# Patient Record
Sex: FEMALE | Race: WHITE | NOT HISPANIC OR LATINO | URBAN - METROPOLITAN AREA
[De-identification: names, ages, dates, MRNs, and addresses within clinical notes are randomized per-mention and may not be internally consistent; named-entity substitution may affect disease eponyms.]

---

## 2017-10-17 ENCOUNTER — INPATIENT (INPATIENT)
Facility: HOSPITAL | Age: 82
LOS: 0 days | Discharge: HOME HEALTH | End: 2017-10-18
Attending: INTERNAL MEDICINE | Admitting: INTERNAL MEDICINE
Payer: COMMERCIAL

## 2017-10-17 VITALS
HEIGHT: 63 IN | DIASTOLIC BLOOD PRESSURE: 96 MMHG | HEART RATE: 92 BPM | TEMPERATURE: 98 F | RESPIRATION RATE: 21 BRPM | OXYGEN SATURATION: 96 % | SYSTOLIC BLOOD PRESSURE: 179 MMHG | WEIGHT: 93.04 LBS

## 2017-10-17 DIAGNOSIS — E03.9 HYPOTHYROIDISM, UNSPECIFIED: ICD-10-CM

## 2017-10-17 DIAGNOSIS — R42 DIZZINESS AND GIDDINESS: ICD-10-CM

## 2017-10-17 DIAGNOSIS — R06.02 SHORTNESS OF BREATH: ICD-10-CM

## 2017-10-17 DIAGNOSIS — J84.10 PULMONARY FIBROSIS, UNSPECIFIED: ICD-10-CM

## 2017-10-17 LAB
ALBUMIN SERPL ELPH-MCNC: 3.6 G/DL — SIGNIFICANT CHANGE UP (ref 3.3–5)
ALP SERPL-CCNC: 129 U/L — HIGH (ref 40–120)
ALT FLD-CCNC: 28 U/L — SIGNIFICANT CHANGE UP (ref 12–78)
ANION GAP SERPL CALC-SCNC: 9 MMOL/L — SIGNIFICANT CHANGE UP (ref 5–17)
APTT BLD: 31.2 SEC — SIGNIFICANT CHANGE UP (ref 27.5–37.4)
AST SERPL-CCNC: 39 U/L — HIGH (ref 15–37)
BASOPHILS # BLD AUTO: 0 K/UL — SIGNIFICANT CHANGE UP (ref 0–0.2)
BASOPHILS NFR BLD AUTO: 0.4 % — SIGNIFICANT CHANGE UP (ref 0–2)
BILIRUB SERPL-MCNC: 0.7 MG/DL — SIGNIFICANT CHANGE UP (ref 0.2–1.2)
BUN SERPL-MCNC: 16 MG/DL — SIGNIFICANT CHANGE UP (ref 7–23)
CALCIUM SERPL-MCNC: 9 MG/DL — SIGNIFICANT CHANGE UP (ref 8.5–10.1)
CHLORIDE SERPL-SCNC: 99 MMOL/L — SIGNIFICANT CHANGE UP (ref 96–108)
CK MB BLD-MCNC: 2.8 % — SIGNIFICANT CHANGE UP (ref 0–3.5)
CK MB CFR SERPL CALC: 2.6 NG/ML — SIGNIFICANT CHANGE UP (ref 0.5–3.6)
CK SERPL-CCNC: 94 U/L — SIGNIFICANT CHANGE UP (ref 26–192)
CO2 SERPL-SCNC: 28 MMOL/L — SIGNIFICANT CHANGE UP (ref 22–31)
CREAT SERPL-MCNC: 0.62 MG/DL — SIGNIFICANT CHANGE UP (ref 0.5–1.3)
EOSINOPHIL # BLD AUTO: 0 K/UL — SIGNIFICANT CHANGE UP (ref 0–0.5)
EOSINOPHIL NFR BLD AUTO: 0.1 % — SIGNIFICANT CHANGE UP (ref 0–6)
FLUAV SPEC QL CULT: NEGATIVE — SIGNIFICANT CHANGE UP
FLUBV AG SPEC QL IA: NEGATIVE — SIGNIFICANT CHANGE UP
GLUCOSE SERPL-MCNC: 138 MG/DL — HIGH (ref 70–99)
HCT VFR BLD CALC: 42.9 % — SIGNIFICANT CHANGE UP (ref 34.5–45)
HGB BLD-MCNC: 14.4 G/DL — SIGNIFICANT CHANGE UP (ref 11.5–15.5)
INR BLD: 1.15 RATIO — SIGNIFICANT CHANGE UP (ref 0.88–1.16)
LACTATE SERPL-SCNC: 2 MMOL/L — SIGNIFICANT CHANGE UP (ref 0.7–2)
LYMPHOCYTES # BLD AUTO: 1 K/UL — SIGNIFICANT CHANGE UP (ref 1–3.3)
LYMPHOCYTES # BLD AUTO: 13.2 % — SIGNIFICANT CHANGE UP (ref 13–44)
MCHC RBC-ENTMCNC: 33.5 GM/DL — SIGNIFICANT CHANGE UP (ref 32–36)
MCHC RBC-ENTMCNC: 34.7 PG — HIGH (ref 27–34)
MCV RBC AUTO: 103.3 FL — HIGH (ref 80–100)
MONOCYTES # BLD AUTO: 0.3 K/UL — SIGNIFICANT CHANGE UP (ref 0–0.9)
MONOCYTES NFR BLD AUTO: 4.2 % — SIGNIFICANT CHANGE UP (ref 2–14)
NEUTROPHILS # BLD AUTO: 6.3 K/UL — SIGNIFICANT CHANGE UP (ref 1.8–7.4)
NEUTROPHILS NFR BLD AUTO: 82.1 % — HIGH (ref 43–77)
PLATELET # BLD AUTO: 128 K/UL — LOW (ref 150–400)
POTASSIUM SERPL-MCNC: 4.5 MMOL/L — SIGNIFICANT CHANGE UP (ref 3.5–5.3)
POTASSIUM SERPL-SCNC: 4.5 MMOL/L — SIGNIFICANT CHANGE UP (ref 3.5–5.3)
PROT SERPL-MCNC: 9.4 GM/DL — HIGH (ref 6–8.3)
PROTHROM AB SERPL-ACNC: 12.6 SEC — SIGNIFICANT CHANGE UP (ref 9.8–12.7)
RBC # BLD: 4.15 M/UL — SIGNIFICANT CHANGE UP (ref 3.8–5.2)
RBC # FLD: 11.8 % — SIGNIFICANT CHANGE UP (ref 11–15)
SODIUM SERPL-SCNC: 136 MMOL/L — SIGNIFICANT CHANGE UP (ref 135–145)
TROPONIN I SERPL-MCNC: 0.02 NG/ML — SIGNIFICANT CHANGE UP (ref 0.01–0.04)
WBC # BLD: 7.7 K/UL — SIGNIFICANT CHANGE UP (ref 3.8–10.5)
WBC # FLD AUTO: 7.7 K/UL — SIGNIFICANT CHANGE UP (ref 3.8–10.5)

## 2017-10-17 PROCEDURE — 71010: CPT | Mod: 26

## 2017-10-17 PROCEDURE — 99285 EMERGENCY DEPT VISIT HI MDM: CPT | Mod: 25

## 2017-10-17 PROCEDURE — 99223 1ST HOSP IP/OBS HIGH 75: CPT

## 2017-10-17 RX ORDER — FUROSEMIDE 40 MG
1 TABLET ORAL
Qty: 0 | Refills: 0 | COMMUNITY

## 2017-10-17 RX ORDER — BUDESONIDE, MICRONIZED 100 %
0.25 POWDER (GRAM) MISCELLANEOUS EVERY 12 HOURS
Qty: 0 | Refills: 0 | Status: DISCONTINUED | OUTPATIENT
Start: 2017-10-17 | End: 2017-10-17

## 2017-10-17 RX ORDER — IPRATROPIUM/ALBUTEROL SULFATE 18-103MCG
3 AEROSOL WITH ADAPTER (GRAM) INHALATION EVERY 6 HOURS
Qty: 0 | Refills: 0 | Status: DISCONTINUED | OUTPATIENT
Start: 2017-10-17 | End: 2017-10-18

## 2017-10-17 RX ORDER — IPRATROPIUM/ALBUTEROL SULFATE 18-103MCG
3 AEROSOL WITH ADAPTER (GRAM) INHALATION ONCE
Qty: 0 | Refills: 0 | Status: COMPLETED | OUTPATIENT
Start: 2017-10-17 | End: 2017-10-17

## 2017-10-17 RX ORDER — LEVOTHYROXINE SODIUM 125 MCG
1 TABLET ORAL
Qty: 0 | Refills: 0 | COMMUNITY

## 2017-10-17 RX ORDER — LEVOTHYROXINE SODIUM 125 MCG
25 TABLET ORAL DAILY
Qty: 0 | Refills: 0 | Status: DISCONTINUED | OUTPATIENT
Start: 2017-10-17 | End: 2017-10-18

## 2017-10-17 RX ORDER — FUROSEMIDE 40 MG
20 TABLET ORAL DAILY
Qty: 0 | Refills: 0 | Status: DISCONTINUED | OUTPATIENT
Start: 2017-10-17 | End: 2017-10-18

## 2017-10-17 RX ORDER — ENOXAPARIN SODIUM 100 MG/ML
30 INJECTION SUBCUTANEOUS EVERY 24 HOURS
Qty: 0 | Refills: 0 | Status: DISCONTINUED | OUTPATIENT
Start: 2017-10-17 | End: 2017-10-18

## 2017-10-17 RX ORDER — AZITHROMYCIN 500 MG/1
500 TABLET, FILM COATED ORAL ONCE
Qty: 0 | Refills: 0 | Status: COMPLETED | OUTPATIENT
Start: 2017-10-17 | End: 2017-10-17

## 2017-10-17 RX ORDER — CEFTRIAXONE 500 MG/1
1 INJECTION, POWDER, FOR SOLUTION INTRAMUSCULAR; INTRAVENOUS ONCE
Qty: 0 | Refills: 0 | Status: COMPLETED | OUTPATIENT
Start: 2017-10-17 | End: 2017-10-17

## 2017-10-17 RX ORDER — BUDESONIDE, MICRONIZED 100 %
0.25 POWDER (GRAM) MISCELLANEOUS EVERY 12 HOURS
Qty: 0 | Refills: 0 | Status: DISCONTINUED | OUTPATIENT
Start: 2017-10-17 | End: 2017-10-18

## 2017-10-17 RX ADMIN — AZITHROMYCIN 255 MILLIGRAM(S): 500 TABLET, FILM COATED ORAL at 09:53

## 2017-10-17 RX ADMIN — Medication 3 MILLILITER(S): at 05:23

## 2017-10-17 RX ADMIN — Medication 3 MILLILITER(S): at 12:23

## 2017-10-17 RX ADMIN — CEFTRIAXONE 100 GRAM(S): 500 INJECTION, POWDER, FOR SOLUTION INTRAMUSCULAR; INTRAVENOUS at 09:53

## 2017-10-17 RX ADMIN — Medication 3 MILLILITER(S): at 17:40

## 2017-10-17 RX ADMIN — ENOXAPARIN SODIUM 30 MILLIGRAM(S): 100 INJECTION SUBCUTANEOUS at 11:42

## 2017-10-17 RX ADMIN — Medication 40 MILLIGRAM(S): at 11:42

## 2017-10-17 RX ADMIN — Medication 3 MILLILITER(S): at 23:35

## 2017-10-17 RX ADMIN — Medication 25 MICROGRAM(S): at 12:22

## 2017-10-17 RX ADMIN — Medication 3 MILLILITER(S): at 05:22

## 2017-10-17 RX ADMIN — Medication 20 MILLIGRAM(S): at 12:22

## 2017-10-17 NOTE — PHYSICAL THERAPY INITIAL EVALUATION ADULT - ADDITIONAL COMMENTS
Pt reports of no stairs. Pt reports of 2 steps c L side railing up/R railing down. Pts daughter (Sherrell) reports pt does not negotiate steps secondary to being in house most of the time.

## 2017-10-17 NOTE — PHYSICAL THERAPY INITIAL EVALUATION ADULT - GENERAL OBSERVATIONS, REHAB EVAL
Pt seen supine in bed, alert and Ox4, daughter (Sherrell) at bedside, cardiac monitor intact, nasal cannula intact on 3.5L. Pt /54, Hr 93, O2 96% on supplemental O2.

## 2017-10-17 NOTE — PHYSICAL THERAPY INITIAL EVALUATION ADULT - MODIFIED CLINICAL TEST OF SENSORY INTEGRATION IN BALANCE TEST
Barthel Index: Feeding Score 10__, Bathing Score _5__, Grooming Score 5___, Dressing Score __10_, Bowels Score ___10, Bladder Score 10___, Toilet Score _10__, Transfers Score _10__, Mobility Score 0___, Stairs Score _0__,     Total Score __70_

## 2017-10-17 NOTE — CONSULT NOTE ADULT - ASSESSMENT
patient is a 89 yo female with possible chronic bronchitis on home oxygen came with c/o shortness of breath and increase in cough . Sputum positive for parainfluenza  Acute bronchitis  Exacerbation of COPD     recommendations  continue bronchodilators   add budesonide   continue prednisone   oxygen via Nasal Cannula   on levaquin   plan discussed with patient   Thank you for this consult  will f/u the patient with you

## 2017-10-17 NOTE — ED ADULT NURSE NOTE - PMH
Pleurisy    Vertigo Hypothyroid    Pleurisy    Vertigo Hypothyroid    Pleurisy    Vertigo    West nile virus infection, unspecified

## 2017-10-17 NOTE — PHYSICAL THERAPY INITIAL EVALUATION ADULT - GAIT DEVIATIONS NOTED, PT EVAL
decreased josephine/decreased velocity of limb motion/decreased step length/increased time in double stance/decreased stride length

## 2017-10-17 NOTE — H&P ADULT - NSHPLABSRESULTS_GEN_ALL_CORE
14.4   7.7   )-----------( 128      ( 17 Oct 2017 05:11 )             42.9   10-17    136  |  99  |  16  ----------------------------<  138<H>  4.5   |  28  |  0.62    Ca    9.0      17 Oct 2017 05:11    TPro  9.4<H>  /  Alb  3.6  /  TBili  0.7  /  DBili  x   /  AST  39<H>  /  ALT  28  /  AlkPhos  129<H>  10-17  < from: Xray Chest 1 View AP/PA. (10.17.17 @ 06:04) >    Chronic emphysematous and fibrotic changes both lungs. Cardiomegaly..

## 2017-10-17 NOTE — ED PROVIDER NOTE - OBJECTIVE STATEMENT
Pertinent PMH/PSH/FHx/SHx and Review of Systems contained within:    89yo F w PMH of west nile virus, hypothyroidism, vertigo, pleurisy presents to ED c/o SOB. Pertinent PMH/PSH/FHx/SHx and Review of Systems contained within:    89yo F w PMH of west nile virus, hypothyroidism, vertigo, ?pulm fibrosis, hemoptysis no longer on ASA, ?cardiac arrhythmia presents to ED c/o SOB.  Family states pt has been having incr cough w secretions & today had incr SOB.  No fever, chills, CP, abd pain, vomiting, diarrhea.      No fever/chills, No photophobia/eye pain/changes in vision, No ear pain/sore throat/dysphagia, No chest pain/palpitations, +SOB/cough, No abdominal pain, No N/V/D, no dysuria/frequency/discharge, No neck/back pain, no rash, no changes in neurological status/function.

## 2017-10-17 NOTE — H&P ADULT - NSHPPHYSICALEXAM_GEN_ALL_CORE
GENERAL: NAD well-developed  HEAD:  Atraumatic, Normocephalic  EYES: EOMI, PERRLA, conjunctiva and sclera clear  ENMT: No tonsillar erythema, exudates, or enlargement; Moist mucous membranes, Good dentition, No lesions  NECK: Supple, No JVD, Normal thyroid  NERVOUS SYSTEM:  Alert & Oriented X3, Good concentration; Motor Strength 5/5 B/L upper and lower extremities; DTRs 2+ intact and symmetric  CHEST/LUNG: decreased breath sounds - with rhonchi   HEART: Regular rate and rhythm; No murmurs, rubs, or gallops  ABDOMEN: Soft, Nontender, Nondistended; Bowel sounds present  EXTREMITIES:  2+ Peripheral Pulses, No clubbing, cyanosis, or edema  LYMPH: No lymphadenopathy   SKIN: No rashes or lesions

## 2017-10-17 NOTE — ED PROVIDER NOTE - MEDICAL DECISION MAKING DETAILS
Pt w SOB, improved w [facility] tx.  Labs neg for acute pathology. Pt w SOB, improved w neb tx.  Labs neg for acute pathology.  Given abx for community acquired PNA.  Pt admitted for further eval/mgmt.

## 2017-10-17 NOTE — H&P ADULT - HISTORY OF PRESENT ILLNESS
· HPI Objective Statement: Pertinent PMH/PSH/FHx/SHx and Review of Systems contained within:    	89yo F w PMH of west nile virus, hypothyroidism, vertigo, ?pulm fibrosis, hemoptysis no longer on ASA, ?cardiac arrhythmia presents to ED c/o SOB.  Family states pt has been having incr cough w secretions & today had incr SOB.  No fever, chills, CP, abd pain, vomiting, diarrhea.      	No fever/chills, No photophobia/eye pain/changes in vision, No ear pain/sore throat/dysphagia, No chest pain/palpitations, +SOB/cough, No abdominal pain, No N/V/D, no dysuria/frequency/discharge, No neck/back pain, no rash, no changes in neurological status/function.

## 2017-10-17 NOTE — ED ADULT TRIAGE NOTE - CHIEF COMPLAINT QUOTE
Pt is here for increasing shortness of breath. Pt has a productive cough but states no respiratory disease. Pt has home oxygen but states she does not have any respiratory illness.

## 2017-10-17 NOTE — CONSULT NOTE ADULT - SUBJECTIVE AND OBJECTIVE BOX
Patient is a 89y old  Female who presents with a chief complaint of short of breath (17 Oct 2017 10:45)  HPI:  · HPI Objective Statement: Pertinent PMH/PSH/FHx/SHx and Review of Systems contained within:    	89yo F w PMH of west nile virus, hypothyroidism, vertigo, ?pulm fibrosis, hemoptysis no longer on ASA, ?cardiac arrhythmia presents to ED c/o SOB.  Family states pt has been having incr cough w secretions & today had incr SOB.  No fever, chills, CP, abd pain, vomiting, diarrhea.  As per patient she has cough but not able to bring up anything. patient was given advair but does not take it because she does not like the way it feels    	No fever/chills, No photophobia/eye pain/changes in vision, No ear pain/sore throat/dysphagia, No chest pain/palpitations, +SOB/cough, No abdominal pain, No N/V/D, no dysuria/frequency/discharge, No neck/back pain, no rash, no changes in neurological status/function. (17 Oct 2017 10:45)  PAST MEDICAL & SURGICAL HISTORY:  Pulmonary fibrosis  West nile virus infection, unspecified  Hypothyroid  Vertigo  Pleurisy  No significant past surgical history  FAMILY HISTORY:  No pertinent family history in first degree relatives  Allergies    aspirin (Other)    Intolerances    MEDICATIONS  (STANDING):  ALBUTerol/ipratropium for Nebulization 3 milliLiter(s) Nebulizer every 6 hours  buDESOnide   0.25 milliGRAM(s) Respule 0.25 milliGRAM(s) Inhalation every 12 hours  enoxaparin Injectable 30 milliGRAM(s) SubCutaneous every 24 hours  furosemide    Tablet 20 milliGRAM(s) Oral daily  levoFLOXacin IVPB      levothyroxine 25 MICROGram(s) Oral daily  predniSONE   Tablet 40 milliGRAM(s) Oral daily    MEDICATIONS  (PRN):    ROS  constitutional - denies any fever, weight loss or weight gain , normal appetite   pulmonary- c/o cough , productive , shortness of breath   cardiovascular- denies any chest pain  GI- denies any nausea , vomiting or diarrhea  Neuro- denies any dizziness or weakness  Vital Signs Last 24 Hrs  T(C): 36.9 (17 Oct 2017 16:35), Max: 36.9 (17 Oct 2017 03:54)  T(F): 98.4 (17 Oct 2017 16:35), Max: 98.4 (17 Oct 2017 03:54)  HR: 81 (17 Oct 2017 17:59) (72 - 99)  BP: 114/57 (17 Oct 2017 16:35) (106/67 - 179/96)  BP(mean): --  RR: 16 (17 Oct 2017 16:35) (16 - 23)  SpO2: 97% (17 Oct 2017 17:59) (94% - 100%)  Physical Exam  patient lying in bed in no respiratory distress  HEENT - EOMI, PERRLA ,neck supple , No JVD  lungs - decreased BS, no wheezing , has ronchi bilateral   COR- [id number] , no murmer  Abd- soft, BS+, no tenderness, no guarding   ext- ecc neg, good pulses  Neuro - Alert , oriented X3   Skin- No rashes     PT/INR - ( 17 Oct 2017 05:11 )   PT: 12.6 sec;   INR: 1.15 ratio         PTT - ( 17 Oct 2017 05:11 )  PTT:31.2 secCARDIAC MARKERS ( 17 Oct 2017 05:11 )  .019 ng/mL / x     / 94 U/L / x     / 2.6 ng/mL                          14.4   7.7   )-----------( 128      ( 17 Oct 2017 05:11 )             42.9   10-17    136  |  99  |  16  ----------------------------<  138<H>  4.5   |  28  |  0.62    Ca    9.0      17 Oct 2017 05:11    TPro  9.4<H>  /  Alb  3.6  /  TBili  0.7  /  DBili  x   /  AST  39<H>  /  ALT  28  /  AlkPhos  129<H>  10-17          RVP:  10-17 @ 08:42  229E Coronavirus: --           Adenovirus: --           Bordetella Pertussis --           Chlamydia Pneumoniae --           Entero/Rhinovirus --           HKU1 Coronavirus --           hMPV --           Influenza A --           Influenza AH1 --           Influenza AH1 2009 --           Influenza AH3 --           Influenza B --           Mycoplasma pneumoniae --           NL63 Coronavirus --           OC43 Coronavirus --           Parainfluenza 1 --           Parainfluenza 2 --           Parainfluenza 3 Detected     Parainfluenza 4 --           Resp Syncytial Virus --       CXR - no infiltrates, emphysematous changes

## 2017-10-17 NOTE — H&P ADULT - NSHPREVIEWOFSYSTEMS_GEN_ALL_CORE
CONSTITUTIONAL: No fever, weight loss, or fatigue  EYES: No eye pain, visual disturbances, or discharge  ENMT:  No difficulty hearing, tinnitus, vertigo; No sinus or throat pain  NECK: No pain or stiffness  RESPIRATORY: POSITIVE  cough, occ wheezing,pos  shortness of breath no sputum production   CARDIOVASCULAR: No chest pain, palpitations, dizziness, POSITIVE  leg swelling  GASTROINTESTINAL: No abdominal or epigastric pain. No nausea, vomiting, or hematemesis; No diarrhea or constipation. No melena or hematochezia.  GENITOURINARY: No dysuria, frequency, hematuria, or incontinence  NEUROLOGICAL: No headaches, memory loss, loss of strength, numbness, or tremors  SKIN: No itching, burning, rashes, or lesions   LYMPH NODES: No enlarged glands  ENDOCRINE: No heat or cold intolerance; No hair loss  MUSCULOSKELETAL: No joint pain or swelling; No muscle, back, or extremity pain  PSYCHIATRIC: No depression, anxiety, mood swings, or difficulty sleeping  HEME/LYMPH: No easy bruising, or bleeding gums  ALLERGY AND IMMUNOLOGIC: No hives or eczema

## 2017-10-17 NOTE — ED PROVIDER NOTE - CARE PLAN
Principal Discharge DX:	SOB (shortness of breath) Principal Discharge DX:	SOB (shortness of breath)  Secondary Diagnosis:	Sputum production

## 2017-10-17 NOTE — ED PROVIDER NOTE - PHYSICAL EXAMINATION
Gen: Alert, speaking in complete sentences  Head: NC, AT, EOMI, normal lids/conjunctiva  ENT: normal hearing, patent oropharynx, MMM  Neck: supple, no tenderness/meningismus/JVD, Trachea midline  Pulm: Bilateral wheeze & rales  CV: RRR, no M/R/G, +dist pulses  Abd: soft, NT/ND, +BS, no guarding/rebound tenderness  Mskel: no edema/erythema/cyanosis  Skin: no rash  Neuro: no sensory/motor deficits

## 2017-10-18 VITALS
DIASTOLIC BLOOD PRESSURE: 67 MMHG | SYSTOLIC BLOOD PRESSURE: 120 MMHG | OXYGEN SATURATION: 95 % | HEART RATE: 89 BPM | TEMPERATURE: 99 F | RESPIRATION RATE: 16 BRPM

## 2017-10-18 LAB
ANION GAP SERPL CALC-SCNC: 9 MMOL/L — SIGNIFICANT CHANGE UP (ref 5–17)
BUN SERPL-MCNC: 16 MG/DL — SIGNIFICANT CHANGE UP (ref 7–23)
CALCIUM SERPL-MCNC: 8.8 MG/DL — SIGNIFICANT CHANGE UP (ref 8.5–10.1)
CHLORIDE SERPL-SCNC: 98 MMOL/L — SIGNIFICANT CHANGE UP (ref 96–108)
CO2 SERPL-SCNC: 31 MMOL/L — SIGNIFICANT CHANGE UP (ref 22–31)
CREAT SERPL-MCNC: 0.6 MG/DL — SIGNIFICANT CHANGE UP (ref 0.5–1.3)
GLUCOSE SERPL-MCNC: 120 MG/DL — HIGH (ref 70–99)
HCT VFR BLD CALC: 41.3 % — SIGNIFICANT CHANGE UP (ref 34.5–45)
HGB BLD-MCNC: 13.7 G/DL — SIGNIFICANT CHANGE UP (ref 11.5–15.5)
MCHC RBC-ENTMCNC: 33.1 GM/DL — SIGNIFICANT CHANGE UP (ref 32–36)
MCHC RBC-ENTMCNC: 34.7 PG — HIGH (ref 27–34)
MCV RBC AUTO: 104.8 FL — HIGH (ref 80–100)
PLATELET # BLD AUTO: 104 K/UL — LOW (ref 150–400)
POTASSIUM SERPL-MCNC: 3.9 MMOL/L — SIGNIFICANT CHANGE UP (ref 3.5–5.3)
POTASSIUM SERPL-SCNC: 3.9 MMOL/L — SIGNIFICANT CHANGE UP (ref 3.5–5.3)
RBC # BLD: 3.94 M/UL — SIGNIFICANT CHANGE UP (ref 3.8–5.2)
RBC # FLD: 11.6 % — SIGNIFICANT CHANGE UP (ref 11–15)
SODIUM SERPL-SCNC: 138 MMOL/L — SIGNIFICANT CHANGE UP (ref 135–145)
WBC # BLD: 6.7 K/UL — SIGNIFICANT CHANGE UP (ref 3.8–10.5)
WBC # FLD AUTO: 6.7 K/UL — SIGNIFICANT CHANGE UP (ref 3.8–10.5)

## 2017-10-18 PROCEDURE — 99239 HOSP IP/OBS DSCHRG MGMT >30: CPT

## 2017-10-18 RX ORDER — BUDESONIDE AND FORMOTEROL FUMARATE DIHYDRATE 160; 4.5 UG/1; UG/1
2 AEROSOL RESPIRATORY (INHALATION)
Qty: 1 | Refills: 0 | OUTPATIENT
Start: 2017-10-18 | End: 2017-11-17

## 2017-10-18 RX ADMIN — Medication 25 MICROGRAM(S): at 05:17

## 2017-10-18 RX ADMIN — Medication 40 MILLIGRAM(S): at 05:17

## 2017-10-18 RX ADMIN — Medication 0.25 MILLIGRAM(S): at 05:52

## 2017-10-18 RX ADMIN — Medication 3 MILLILITER(S): at 11:26

## 2017-10-18 RX ADMIN — Medication 20 MILLIGRAM(S): at 05:17

## 2017-10-18 RX ADMIN — Medication 3 MILLILITER(S): at 05:52

## 2017-10-18 NOTE — DISCHARGE NOTE ADULT - HOSPITAL COURSE
89yo F w PMH of west nile virus, hypothyroidism, vertigo, ?pulm fibrosis, hemoptysis no longer on ASA, ?cardiac arrhythmia presents to ED c/o SOB.  Family states pt has been having incr cough w secretions & today had incr SOB.  No fever, chills, CP, abd pain, vomiting, diarrhea.         Problem/Plan - 1:  ·  Problem: SOB (shortness of breath).  Plan: symbicort, prednisone, c/w abx, can f/u as outpatient      Problem/Plan - 2:  ·  Problem: Pulmonary fibrosis.  Plan: as above   home oxygen -has.      Problem/Plan - 3:  ·  Problem: Acquired hypothyroidism.  Plan: synthroid.      Problem/Plan - 4:  ·  Problem: Vertigo.  Plan: physical therapy.       45min spent on discharge planning

## 2017-10-18 NOTE — DISCHARGE NOTE ADULT - MEDICATION SUMMARY - MEDICATIONS TO TAKE
I will START or STAY ON the medications listed below when I get home from the hospital:    predniSONE 20 mg oral tablet  -- 2 tab(s) by mouth once a day  -- Indication: For SHORTNESS OF BREATH    Symbicort 160 mcg-4.5 mcg/inh inhalation aerosol  -- 2 puff(s) inhaled 2 times a day   -- Check with your doctor before becoming pregnant.  For inhalation only.  Rinse mouth thoroughly after use.    -- Indication: For SOB (shortness of breath)    Lasix 20 mg oral tablet  -- 1 tab(s) by mouth once a day  -- Indication: For SOB (shortness of breath)    Levaquin 250 mg oral tablet  -- 1 tab(s) by mouth once a day   -- Avoid prolonged or excessive exposure to direct and/or artificial sunlight while taking this medication.  Do not take dairy products, antacids, or iron preparations within one hour of this medication.  Finish all this medication unless otherwise directed by prescriber.  May cause drowsiness or dizziness.  Medication should be taken with plenty of water.    -- Indication: For SOB (shortness of breath)    Synthroid 25 mcg (0.025 mg) oral tablet  -- 1 tab(s) by mouth once a day  -- Indication: For Acquired hypothyroidism

## 2017-10-18 NOTE — DISCHARGE NOTE ADULT - CARE PLAN
Principal Discharge DX:	SOB (shortness of breath)  Goal:	c/w abx, steroids, inhaler  Instructions for follow-up, activity and diet:	f/u with pcp, activity as tolerated, heart healthy diet  Secondary Diagnosis:	Pulmonary fibrosis  Secondary Diagnosis:	Acquired hypothyroidism

## 2017-10-20 DIAGNOSIS — E03.9 HYPOTHYROIDISM, UNSPECIFIED: ICD-10-CM

## 2017-10-20 DIAGNOSIS — R06.02 SHORTNESS OF BREATH: ICD-10-CM

## 2017-10-20 DIAGNOSIS — Z88.8 ALLERGY STATUS TO OTHER DRUGS, MEDICAMENTS AND BIOLOGICAL SUBSTANCES: ICD-10-CM

## 2017-10-20 DIAGNOSIS — Z28.21 IMMUNIZATION NOT CARRIED OUT BECAUSE OF PATIENT REFUSAL: ICD-10-CM

## 2017-10-20 DIAGNOSIS — J44.0 CHRONIC OBSTRUCTIVE PULMONARY DISEASE WITH (ACUTE) LOWER RESPIRATORY INFECTION: ICD-10-CM

## 2017-10-20 DIAGNOSIS — J44.1 CHRONIC OBSTRUCTIVE PULMONARY DISEASE WITH (ACUTE) EXACERBATION: ICD-10-CM

## 2017-10-20 DIAGNOSIS — J20.4 ACUTE BRONCHITIS DUE TO PARAINFLUENZA VIRUS: ICD-10-CM

## 2017-10-20 DIAGNOSIS — J84.10 PULMONARY FIBROSIS, UNSPECIFIED: ICD-10-CM

## 2017-10-20 DIAGNOSIS — Z77.22 CONTACT WITH AND (SUSPECTED) EXPOSURE TO ENVIRONMENTAL TOBACCO SMOKE (ACUTE) (CHRONIC): ICD-10-CM

## 2017-10-20 DIAGNOSIS — Z99.81 DEPENDENCE ON SUPPLEMENTAL OXYGEN: ICD-10-CM

## 2017-10-22 LAB
CULTURE RESULTS: SIGNIFICANT CHANGE UP
CULTURE RESULTS: SIGNIFICANT CHANGE UP
SPECIMEN SOURCE: SIGNIFICANT CHANGE UP
SPECIMEN SOURCE: SIGNIFICANT CHANGE UP

## 2019-06-04 NOTE — PHYSICAL THERAPY INITIAL EVALUATION ADULT - CRITERIA FOR SKILLED THERAPEUTIC INTERVENTIONS
GYN PROGRESS NOTE    Pt seen and examined at bedside. O/N CT deferred due to concern for increased respiratory secretions.     MEDICATIONS  (STANDING):  acetylcysteine 20%  Inhalation 4 milliLiter(s) Inhalation three times a day  albumin human  5% IVPB 2750 milliLiter(s) IV Intermittent once  ALBUTerol   0.5% 2.5 milliGRAM(s) Nebulizer every 8 hours  ascorbic acid 500 milliGRAM(s) Oral daily  calcium gluconate IVPB 1 Gram(s) IV Intermittent once  chlorhexidine 0.12% Liquid 15 milliLiter(s) Oral Mucosa every 12 hours  chlorhexidine 2% Cloths 1 Application(s) Topical <User Schedule>  dextrose 5%. 1000 milliLiter(s) (50 mL/Hr) IV Continuous <Continuous>  dextrose 50% Injectable 25 Gram(s) IV Push once  fludroCORTISONE 0.1 milliGRAM(s) Oral every 24 hours  heparin  flush 100 Units/mL Injectable 2000 Unit(s) IV Push once  Heparin 1000 Units / ml 2 milliLiter(s) 2 milliLiter(s) IV Push once  insulin glargine Injectable (LANTUS) 4 Unit(s) SubCutaneous at bedtime  insulin regular  human corrective regimen sliding scale   SubCutaneous every 6 hours  lactated ringers. 1000 milliLiter(s) (80 mL/Hr) IV Continuous <Continuous>  midodrine 10 milliGRAM(s) Oral every 8 hours  morphine  - Injectable 2 milliGRAM(s) IV Push once  nystatin Cream 1 Application(s) Topical two times a day  sodium chloride 0.9% for Nebulization 3 milliLiter(s) Nebulizer every 4 hours  zinc oxide 20% Ointment 1 Application(s) Topical three times a day    MEDICATIONS  (PRN):  acetaminophen    Suspension .. 1000 milliGRAM(s) Oral every 8 hours PRN Moderate Pain (4 - 6)  dextrose 40% Gel 15 Gram(s) Oral once PRN Blood Glucose LESS THAN 70 milliGRAM(s)/deciliter  glucagon  Injectable 1 milliGRAM(s) IntraMuscular once PRN Glucose LESS THAN 70 milligrams/deciliter  lidocaine 2% Gel 1 Application(s) Topical two times a day PRN pain around PEG site  sodium chloride 0.9% lock flush 10 milliLiter(s) IV Push every 1 hour PRN Pre/post blood products, medications, blood draw, and to maintain line patency        Vital Signs Last 24 Hrs  T(C): 37.1 (03 Jun 2019 21:00), Max: 37.1 (03 Jun 2019 21:00)  T(F): 98.8 (03 Jun 2019 21:00), Max: 98.8 (03 Jun 2019 21:00)  HR: 65 (04 Jun 2019 01:00) (62 - 95)  BP: 119/79 (04 Jun 2019 01:00) (112/79 - 146/81)  BP(mean): --  RR: 12 (04 Jun 2019 01:21) (12 - 18)  SpO2: 100% (04 Jun 2019 01:21) (97% - 100%)    I&O's Summary    02 Jun 2019 07:01  -  03 Jun 2019 07:00  --------------------------------------------------------  IN: 0 mL / OUT: 775 mL / NET: -775 mL    03 Jun 2019 07:01  -  04 Jun 2019 06:06  --------------------------------------------------------  IN: 800 mL / OUT: 400 mL / NET: 400 mL      NEURO: good strength in B/l UE, increased mobility in b/l LE   GEN: patient appears well  LUNGS: no respiratory distress  Sacrum: erythematous and peeling  : joseph draining yellow, cloudy urine                           6.9    10.94 )-----------( 153      ( 03 Jun 2019 10:39 )             22.8   06-03    139  |  98  |  19  ----------------------------<  131<H>  3.4<L>   |  29  |  0.46<L>    Ca    9.5      03 Jun 2019 06:33  Phos  4.6     06-03  Mg     1.8     06-03 GYN PROGRESS NOTE    Pt seen and examined at bedside. O/N CT deferred due to concern for increased respiratory secretions. Patient still reporting leakage of stool, nurse corroborates information and states there has been feces leaking when cleaning the patient.     MEDICATIONS  (STANDING):  acetylcysteine 20%  Inhalation 4 milliLiter(s) Inhalation three times a day  albumin human  5% IVPB 2750 milliLiter(s) IV Intermittent once  ALBUTerol   0.5% 2.5 milliGRAM(s) Nebulizer every 8 hours  ascorbic acid 500 milliGRAM(s) Oral daily  calcium gluconate IVPB 1 Gram(s) IV Intermittent once  chlorhexidine 0.12% Liquid 15 milliLiter(s) Oral Mucosa every 12 hours  chlorhexidine 2% Cloths 1 Application(s) Topical <User Schedule>  dextrose 5%. 1000 milliLiter(s) (50 mL/Hr) IV Continuous <Continuous>  dextrose 50% Injectable 25 Gram(s) IV Push once  fludroCORTISONE 0.1 milliGRAM(s) Oral every 24 hours  heparin  flush 100 Units/mL Injectable 2000 Unit(s) IV Push once  Heparin 1000 Units / ml 2 milliLiter(s) 2 milliLiter(s) IV Push once  insulin glargine Injectable (LANTUS) 4 Unit(s) SubCutaneous at bedtime  insulin regular  human corrective regimen sliding scale   SubCutaneous every 6 hours  lactated ringers. 1000 milliLiter(s) (80 mL/Hr) IV Continuous <Continuous>  midodrine 10 milliGRAM(s) Oral every 8 hours  morphine  - Injectable 2 milliGRAM(s) IV Push once  nystatin Cream 1 Application(s) Topical two times a day  sodium chloride 0.9% for Nebulization 3 milliLiter(s) Nebulizer every 4 hours  zinc oxide 20% Ointment 1 Application(s) Topical three times a day    MEDICATIONS  (PRN):  acetaminophen    Suspension .. 1000 milliGRAM(s) Oral every 8 hours PRN Moderate Pain (4 - 6)  dextrose 40% Gel 15 Gram(s) Oral once PRN Blood Glucose LESS THAN 70 milliGRAM(s)/deciliter  glucagon  Injectable 1 milliGRAM(s) IntraMuscular once PRN Glucose LESS THAN 70 milligrams/deciliter  lidocaine 2% Gel 1 Application(s) Topical two times a day PRN pain around PEG site  sodium chloride 0.9% lock flush 10 milliLiter(s) IV Push every 1 hour PRN Pre/post blood products, medications, blood draw, and to maintain line patency        Vital Signs Last 24 Hrs  T(C): 37.1 (03 Jun 2019 21:00), Max: 37.1 (03 Jun 2019 21:00)  T(F): 98.8 (03 Jun 2019 21:00), Max: 98.8 (03 Jun 2019 21:00)  HR: 65 (04 Jun 2019 01:00) (62 - 95)  BP: 119/79 (04 Jun 2019 01:00) (112/79 - 146/81)  BP(mean): --  RR: 12 (04 Jun 2019 01:21) (12 - 18)  SpO2: 100% (04 Jun 2019 01:21) (97% - 100%)    I&O's Summary    02 Jun 2019 07:01  -  03 Jun 2019 07:00  --------------------------------------------------------  IN: 0 mL / OUT: 775 mL / NET: -775 mL    03 Jun 2019 07:01  -  04 Jun 2019 06:06  --------------------------------------------------------  IN: 800 mL / OUT: 400 mL / NET: 400 mL      NEURO: good strength in B/l UE, increased mobility in b/l LE   GEN: patient appears well  LUNGS: no respiratory distress  Sacrum: erythematous and peeling  : joseph draining yellow, cloudy urine                           6.9    10.94 )-----------( 153      ( 03 Jun 2019 10:39 )             22.8   06-03    139  |  98  |  19  ----------------------------<  131<H>  3.4<L>   |  29  |  0.46<L>    Ca    9.5      03 Jun 2019 06:33  Phos  4.6     06-03  Mg     1.8     06-03 predicted duration of therapy intervention/therapy frequency/risk reduction/prevention/anticipated discharge recommendation/impairments found/Home with home PT. Pt has home O2 and is PRN./functional limitations in following categories

## 2023-01-20 NOTE — PATIENT PROFILE ADULT. - NSTOBACCONEVERSMOKERY/N_GEN_A
No Topical Ketoconazole Counseling: Patient counseled that this medication may cause skin irritation or allergic reactions.  In the event of skin irritation, the patient was advised to reduce the amount of the drug applied or use it less frequently.   The patient verbalized understanding of the proper use and possible adverse effects of ketoconazole.  All of the patient's questions and concerns were addressed.

## 2025-03-28 NOTE — ED ADULT NURSE NOTE - NS ED NURSE RECORD ANOTHER VITAL SIGN
Patient would like communication of their results via:  LIA    Cell Phone:   Telephone Information:   Mobile [phone]     Okay to leave a message containing results? Yes             Health Maintenance       Hepatitis B Vaccine (1 of 3 - 19+ 3-dose series)  Never done    Pneumococcal Vaccine 0-49 (1 of 2 - PCV)  Never done    COVID-19 Vaccine (5 - 2024-25 season)  Overdue since 9/1/2024           Following review of the above:  Patient is not proceeding with: COVID-19, Hep B, and Pneumococcal    Note: Refer to final orders and clinician documentation.                          Yes